# Patient Record
Sex: MALE | URBAN - METROPOLITAN AREA
[De-identification: names, ages, dates, MRNs, and addresses within clinical notes are randomized per-mention and may not be internally consistent; named-entity substitution may affect disease eponyms.]

---

## 2022-01-03 ENCOUNTER — APPOINTMENT (OUTPATIENT)
Dept: URBAN - METROPOLITAN AREA SURGERY 8 | Age: 57
Setting detail: DERMATOLOGY
End: 2022-01-03

## 2022-01-03 DIAGNOSIS — L30.8 OTHER SPECIFIED DERMATITIS: ICD-10-CM

## 2022-01-03 PROCEDURE — OTHER TREATMENT REGIMEN: OTHER

## 2022-01-03 PROCEDURE — OTHER ADDITIONAL NOTES: OTHER

## 2022-01-03 PROCEDURE — OTHER PRESCRIPTION: OTHER

## 2022-01-03 PROCEDURE — OTHER COUNSELING: OTHER

## 2022-01-03 PROCEDURE — 99203 OFFICE O/P NEW LOW 30 MIN: CPT

## 2022-01-03 RX ORDER — CLOBETASOL PROPIONATE 0.5 MG/G
OINTMENT TOPICAL
Qty: 60 | Refills: 1 | Status: ERX | COMMUNITY
Start: 2022-01-03

## 2022-01-03 ASSESSMENT — LOCATION ZONE DERM: LOCATION ZONE: HAND

## 2022-01-03 ASSESSMENT — LOCATION DETAILED DESCRIPTION DERM
LOCATION DETAILED: RIGHT ULNAR DORSAL HAND
LOCATION DETAILED: LEFT ULNAR DORSAL HAND

## 2022-01-03 ASSESSMENT — LOCATION SIMPLE DESCRIPTION DERM
LOCATION SIMPLE: RIGHT HAND
LOCATION SIMPLE: LEFT HAND

## 2022-01-03 NOTE — PROCEDURE: TREATMENT REGIMEN
Show Eltamd Line: Yes
Action 4: Continue
Continue Regimen: Soak hands in warm water, pat dry until damp, then apply Clobetasol ointment: apply nightly for 2 weeks and then hold for a week. Repeat cycle if needed. Apply Vaseline after Clobetasol ointment. Wear cotton gloves to allow greater penetration of ointment
Detail Level: Zone
Increase Regimen: Moisturize multiple times a day

## 2022-01-03 NOTE — PROCEDURE: ADDITIONAL NOTES
Detail Level: Simple
Render Risk Assessment In Note?: no
Additional Notes: hand dermatitis with component of xerotic/irritant hand dermatitis due to handwashing vs allergic contact from chemicals at work ()\\n\\nstart clobetasol ointment under soak and smear BID prn. if fails to improve with ointment and after winter ends, could benefit from patch testing